# Patient Record
Sex: FEMALE | Race: OTHER | Employment: UNEMPLOYED | URBAN - METROPOLITAN AREA
[De-identification: names, ages, dates, MRNs, and addresses within clinical notes are randomized per-mention and may not be internally consistent; named-entity substitution may affect disease eponyms.]

---

## 2017-03-07 ENCOUNTER — TELEPHONE (OUTPATIENT)
Dept: OBGYN CLINIC | Facility: CLINIC | Age: 32
End: 2017-03-07

## 2017-03-07 NOTE — TELEPHONE ENCOUNTER
Pt reported she moved out of town a year ago and just switched to an Ob by her home. Per pt Dr. Cherise Braun requesting the last visit notes and pap smear to be faxed to [phone].   Pap results faxed per pt request.

## 2017-04-26 ENCOUNTER — TELEPHONE (OUTPATIENT)
Dept: OBGYN CLINIC | Facility: CLINIC | Age: 32
End: 2017-04-26

## 2017-04-26 NOTE — TELEPHONE ENCOUNTER
See notes 3/7/17 pt req some info and her pap report was not sent over, she also needs the office notes from that visit . pls mail out tomorrow if possible. Okay to esvin vm call once sent. [phone].

## 2017-11-24 ENCOUNTER — CHARTING TRANS (OUTPATIENT)
Dept: OTHER | Age: 32
End: 2017-11-24

## 2018-02-24 ENCOUNTER — CHARTING TRANS (OUTPATIENT)
Dept: OTHER | Age: 33
End: 2018-02-24

## 2018-02-24 ENCOUNTER — LAB SERVICES (OUTPATIENT)
Dept: OTHER | Age: 33
End: 2018-02-24

## 2018-02-24 LAB — RAPID STREP GROUP A: POSITIVE

## 2018-11-01 VITALS
HEART RATE: 88 BPM | HEIGHT: 63 IN | WEIGHT: 175 LBS | SYSTOLIC BLOOD PRESSURE: 108 MMHG | DIASTOLIC BLOOD PRESSURE: 68 MMHG | BODY MASS INDEX: 31.01 KG/M2 | TEMPERATURE: 98.3 F

## 2018-11-02 VITALS
TEMPERATURE: 98.9 F | HEART RATE: 88 BPM | BODY MASS INDEX: 31.01 KG/M2 | WEIGHT: 175 LBS | RESPIRATION RATE: 14 BRPM | HEIGHT: 63 IN

## 2019-04-02 PROCEDURE — 81001 URINALYSIS AUTO W/SCOPE: CPT | Performed by: INTERNAL MEDICINE

## 2021-02-26 PROBLEM — E55.9 VITAMIN D DEFICIENCY: Status: ACTIVE | Noted: 2021-02-26

## 2021-02-26 PROBLEM — E83.42 HYPOMAGNESEMIA: Status: RESOLVED | Noted: 2021-02-26 | Resolved: 2021-02-26

## 2021-02-26 PROBLEM — E83.42 HYPOMAGNESEMIA: Status: ACTIVE | Noted: 2021-02-26
